# Patient Record
Sex: MALE | Race: WHITE | NOT HISPANIC OR LATINO | Employment: OTHER | URBAN - METROPOLITAN AREA
[De-identification: names, ages, dates, MRNs, and addresses within clinical notes are randomized per-mention and may not be internally consistent; named-entity substitution may affect disease eponyms.]

---

## 2017-10-29 ENCOUNTER — OFFICE VISIT (OUTPATIENT)
Dept: URGENT CARE | Facility: PHYSICIAN GROUP | Age: 67
End: 2017-10-29
Payer: MEDICARE

## 2017-10-29 VITALS
HEART RATE: 82 BPM | TEMPERATURE: 100 F | DIASTOLIC BLOOD PRESSURE: 64 MMHG | BODY MASS INDEX: 29.68 KG/M2 | WEIGHT: 200.4 LBS | OXYGEN SATURATION: 94 % | SYSTOLIC BLOOD PRESSURE: 100 MMHG | HEIGHT: 69 IN | RESPIRATION RATE: 16 BRPM

## 2017-10-29 DIAGNOSIS — L50.8 URTICARIA, ACUTE: Primary | ICD-10-CM

## 2017-10-29 PROCEDURE — 99202 OFFICE O/P NEW SF 15 MIN: CPT | Performed by: NURSE PRACTITIONER

## 2017-10-29 RX ORDER — ASPIRIN 81 MG/1
81 TABLET, CHEWABLE ORAL DAILY
COMMUNITY

## 2017-10-29 RX ORDER — PREDNISONE 20 MG/1
40 TABLET ORAL DAILY
Qty: 8 TAB | Refills: 0 | Status: SHIPPED | OUTPATIENT
Start: 2017-10-29

## 2017-10-29 RX ORDER — LISINOPRIL 20 MG/1
20 TABLET ORAL DAILY
COMMUNITY

## 2017-10-29 ASSESSMENT — ENCOUNTER SYMPTOMS
VOMITING: 0
HEADACHES: 0
MYALGIAS: 0
CHILLS: 0
SHORTNESS OF BREATH: 0
NAUSEA: 0
FEVER: 0
RHINORRHEA: 0
WHEEZING: 0

## 2017-10-29 NOTE — PATIENT INSTRUCTIONS
Hives  Hives are itchy, red, swollen areas of the skin. They can vary in size and location on your body. Hives can come and go for hours or several days (acute hives) or for several weeks (chronic hives). Hives do not spread from person to person (noncontagious). They may get worse with scratching, exercise, and emotional stress.  CAUSES   · Allergic reaction to food, additives, or drugs.  · Infections, including the common cold.  · Illness, such as vasculitis, lupus, or thyroid disease.  · Exposure to sunlight, heat, or cold.  · Exercise.  · Stress.  · Contact with chemicals.  SYMPTOMS   · Red or white swollen patches on the skin. The patches may change size, shape, and location quickly and repeatedly.  · Itching.  · Swelling of the hands, feet, and face. This may occur if hives develop deeper in the skin.  DIAGNOSIS   Your caregiver can usually tell what is wrong by performing a physical exam. Skin or blood tests may also be done to determine the cause of your hives. In some cases, the cause cannot be determined.  TREATMENT   Mild cases usually get better with medicines such as antihistamines. Severe cases may require an emergency epinephrine injection. If the cause of your hives is known, treatment includes avoiding that trigger.   HOME CARE INSTRUCTIONS   · Avoid causes that trigger your hives.  · Take antihistamines as directed by your caregiver to reduce the severity of your hives. Non-sedating or low-sedating antihistamines are usually recommended. Do not drive while taking an antihistamine.  · Take any other medicines prescribed for itching as directed by your caregiver.  · Wear loose-fitting clothing.  · Keep all follow-up appointments as directed by your caregiver.  SEEK MEDICAL CARE IF:   · You have persistent or severe itching that is not relieved with medicine.  · You have painful or swollen joints.  SEEK IMMEDIATE MEDICAL CARE IF:   · You have a fever.  · Your tongue or lips are swollen.  · You have  trouble breathing or swallowing.  · You feel tightness in the throat or chest.  · You have abdominal pain.  These problems may be the first sign of a life-threatening allergic reaction. Call your local emergency services (911 in [place]  MAKE SURE YOU:   · Understand these instructions.  · Will watch your condition.  · Will get help right away if you are not doing well or get worse.     This information is not intended to replace advice given to you by your health care provider. Make sure you discuss any questions you have with your health care provider.     Document Released: 12/18/2006 Document Revised: 12/23/2014 Document Reviewed: 03/12/2013  [employer] Interactive Patient Education ©2016 [employer]

## 2017-10-29 NOTE — PROGRESS NOTES
"Subjective:      Jacinto Perez is a 67 y.o. male who presents with Rash (redness, itching, around abdominal area, hands and legs and thigs x 1 day)            Medications, Allergies and Prior Medical Hx reviewed and updated in [facility].with patient/family today     Pt returned from hunting yesterday and developed an itching rash that is generalized. Pt has swelling to lower lip which has resolved.  Pt has hx of asthma, denies any sob or wheezing      Rash   This is a new problem. The current episode started yesterday. The problem has been gradually worsening since onset. The rash is diffuse. The rash is characterized by itchiness and redness. He was exposed to nothing. Pertinent negatives include no congestion, fever, rhinorrhea, shortness of breath or vomiting. Past treatments include nothing. The treatment provided no relief. His past medical history is significant for asthma.       Review of Systems   Constitutional: Negative for chills and fever.   HENT: Negative for congestion and rhinorrhea.    Respiratory: Negative for shortness of breath and wheezing.    Gastrointestinal: Negative for nausea and vomiting.   Musculoskeletal: Negative for myalgias.   Skin: Positive for itching and rash.   Neurological: Negative for headaches.          Objective:     /64   Pulse 82   Temp 37.8 °C (100 °F)   Resp 16   Ht 1.74 m (5' 8.5\")   Wt 90.9 kg (200 lb 6.4 oz)   SpO2 94%   BMI 30.03 kg/m²      Physical Exam   Constitutional: He appears well-developed and well-nourished. No distress.   HENT:   Head: Normocephalic and atraumatic.   Mouth/Throat: Uvula is midline and mucous membranes are normal. No uvula swelling. No posterior oropharyngeal edema or posterior oropharyngeal erythema.   Eyes: Conjunctivae are normal. Pupils are equal, round, and reactive to light.   Neck: Neck supple.   Cardiovascular: Normal rate, regular rhythm and normal heart sounds.    Pulmonary/Chest: Effort normal and breath sounds " normal. No respiratory distress.   Neurological: He is alert.   Awake, alert, answering questions appropriately, moving all extremeties   Skin: Skin is warm and dry. Capillary refill takes less than 2 seconds. Rash noted. Rash is urticarial.   Psychiatric: He has a normal mood and affect. His behavior is normal.               Assessment/Plan:       1. Urticaria, acute  predniSONE (DELTASONE) 20 MG Tab       Otc Benadryl or Zyrtec or Claritin etc  Pt will go to the ER for worsening or changing symptoms as discussed, worsening or changing rash, fevers, swelling mouth or throat, difficulty breathing  Follow-up with your primary care provider or return here if not improving in 2-3 days   Discharge instructions discussed with pt/family who verbalize understanding and agreement with poc

## 2021-02-01 DIAGNOSIS — Z23 NEED FOR VACCINATION: ICD-10-CM
